# Patient Record
Sex: FEMALE | Race: WHITE | NOT HISPANIC OR LATINO | Employment: OTHER | URBAN - METROPOLITAN AREA
[De-identification: names, ages, dates, MRNs, and addresses within clinical notes are randomized per-mention and may not be internally consistent; named-entity substitution may affect disease eponyms.]

---

## 2018-04-04 ENCOUNTER — ESTABLISHED COMPREHENSIVE EXAM (OUTPATIENT)
Dept: URBAN - METROPOLITAN AREA CLINIC 46 | Facility: CLINIC | Age: 53
End: 2018-04-04

## 2018-04-04 DIAGNOSIS — H52.7: ICD-10-CM

## 2018-04-04 PROCEDURE — 92014 COMPRE OPH EXAM EST PT 1/>: CPT

## 2018-04-04 PROCEDURE — 92015 DETERMINE REFRACTIVE STATE: CPT

## 2018-04-04 ASSESSMENT — VISUAL ACUITY
OD_CC: 20/20
OS_CC: 20/25
OD_CC: J1+
OS_CC: J1+
OS_OTHER: FL-.25
OD_OTHER: FL-.25

## 2018-04-04 ASSESSMENT — TONOMETRY
OD_IOP_MMHG: 15
OS_IOP_MMHG: 15

## 2018-07-18 NOTE — PATIENT DISCUSSION
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 - Plan : See Plan # 1. Patient may consider lid surgery.

## 2018-07-18 NOTE — PATIENT DISCUSSION
(H25.013) Cortical age-related cataract, bilateral - Assesment : Examination revealed Cortical Senile Cataract OU. Patient is symptomatic with visual function affected. - Plan : Monitor for changes. Advised patient to call our office with decreased vision or increased symptoms.

## 2018-07-18 NOTE — PATIENT DISCUSSION
(H10.45) Other Chronic Allergic Conjunctivitis - Assesment : Examination revealed Allergic Conjunctivitis OS. - Plan : Continue Restasis BID OU. Recommend Alaway or Zatidor BID OU for itching, PF ATS 3-4 times daily: suggestions Thera Tears Blink Optive and GenTeal.  Monitor for changes. Advised patient to call our office with decreased vision or an increase in symptoms.     RTC 1 month  follow up

## 2018-08-14 NOTE — PATIENT DISCUSSION
(H10.45) Other Chronic Allergic Conjunctivitis - Assesment : Examination revealed Allergic Conjunctivitis OS. - Plan : Continue Restasis BID OU. Stop Alaway or Zatidor BID OU for itching, continue  PF ATS 3-4 times daily. Start Alrex BID for three weeks  Monitor for changes. Advised patient to call our office with decreased vision or an increase in symptoms.     RTC 3 weeks dilation/refraction

## 2018-09-19 NOTE — PATIENT DISCUSSION
(H10.45) Other chronic allergic conjunctivitis - Assesment : Examination revealed chronic Allergic Conjunctivitis OU. - Plan : see plan #1. Monitor for changes. Advised patient to call our office with decreased vision or an increase in symptoms.

## 2018-09-19 NOTE — PATIENT DISCUSSION
(F32.107) Keratoconjunct sicca, not specified as Sjogren's, bilateral - Assesment : Examination revealed Dry Eye Syndrome OU. - Plan : Recommend continue artificial tears 2-3 times daily OU. Continue restasis BID OU and decrease Alrex QD OU. Monitor for changes. Advised patient to call our office with decreased vision or increased symptoms. RV 6 months follow up.

## 2018-09-19 NOTE — PATIENT DISCUSSION
(H35.205) Drusen (degenerative) of macula, bilateral - Assesment : Examination revealed Drusen OU. - Plan : Monitor for changes. Advised patient to call our office with decreased vision or increased symptoms.

## 2018-12-12 ENCOUNTER — CONTACT LENS FOLLOW UP (OUTPATIENT)
Dept: URBAN - METROPOLITAN AREA CLINIC 46 | Facility: CLINIC | Age: 53
End: 2018-12-12

## 2018-12-12 DIAGNOSIS — H52.7: ICD-10-CM

## 2018-12-12 DIAGNOSIS — H04.123: ICD-10-CM

## 2018-12-12 PROCEDURE — 92310-1 LEVEL 1 CONTACT LENS MANAGEMENT

## 2018-12-13 NOTE — PATIENT DISCUSSION
(H10.45) Other chronic allergic conjunctivitis - Assesment : Examination revealed chronic Allergic Conjunctivitis OU. Orbital fat prolapse lower lid area due to being recumbent at night. Medical intervention is not warranted. - Plan : Continue Alrex BID OU 10 to 14 days. Monitor for changes. Advised patient to call our office with decreased vision or an increase in symptoms.     RTC 2-3 week/EXAM

## 2018-12-13 NOTE — PATIENT DISCUSSION
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 - Plan : No surgical intervention warranted at htis time.

## 2018-12-13 NOTE — PATIENT DISCUSSION
(H25.013) Cortical age-related cataract, bilateral - Assesment : Examination revealed Cortical Senile Cataract. Patient is symptomatic with visual function affected. Advised patient that he will not  have to go off of blood thinner fro cataract surgery. - Plan : Monitor for changes. Advised patient to call our office with decreased vision or increased symptoms. Discuss cataract surgery at next visit.

## 2019-02-13 NOTE — PATIENT DISCUSSION
(H25.013) Cortical age-related cataract, bilateral - Assesment : Examination revealed Cortical Senile Cataract. Patient is symptomatic with visual function affected. - Plan : Monitor for changes. Advised patient to call our office with decreased vision or increased symptoms.    RTC 1 year/EXAM

## 2019-02-13 NOTE — PATIENT DISCUSSION
(D23.121) Other benign neoplasm skin/ left upper eyelid, inc canthus - Assesment : Examination revealed benign neoplasm of the lids. - Plan : Monitor for changes. Advised patient to call our office with decreased vision or an increase in symptoms. Advised to use some neosporin on the area.

## 2019-02-13 NOTE — PATIENT DISCUSSION
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 - Plan : No surgical intervention warranted at this time.

## 2019-02-13 NOTE — PATIENT DISCUSSION
(H35.789) Drusen (degenerative) of macula, bilateral - Assesment : Examination revealed Drusen OU. - Plan : Monitor for changes. Advised patient to call our office with decreased vision or increased symptoms.

## 2019-06-27 NOTE — PATIENT DISCUSSION
(R99.457) Keratoconjunct sicca, not specified as Sjogren's, bilateral - Assesment : Examination revealed Dry Eye Syndrome OU. TBUT 3 seconds OU. Patient has been on restasis for 2-3 years. - Plan : Continue restasis BID OU. Monitor for changes. Advised patient to call our office with decreased vision or increased symptoms.  Keep scheduled appt

## 2020-02-26 ENCOUNTER — ESTABLISHED COMPREHENSIVE EXAM (OUTPATIENT)
Dept: URBAN - METROPOLITAN AREA CLINIC 46 | Facility: CLINIC | Age: 55
End: 2020-02-26

## 2020-02-26 DIAGNOSIS — Z01.00: ICD-10-CM

## 2020-02-26 DIAGNOSIS — H52.7: ICD-10-CM

## 2020-02-26 PROCEDURE — 92014 COMPRE OPH EXAM EST PT 1/>: CPT

## 2020-02-26 PROCEDURE — 92015 DETERMINE REFRACTIVE STATE: CPT

## 2020-02-26 ASSESSMENT — VISUAL ACUITY
OS_CC: J1
OU_CC: 20/20
OS_OTHER: FL-.25
OD_OTHER: FL+.25
OD_CC: J1
OU_CC: J1+
OD_CC: 20/20
OS_CC: 20/20

## 2020-02-26 ASSESSMENT — TONOMETRY
OS_IOP_MMHG: 16
OD_IOP_MMHG: 16

## 2021-10-06 ENCOUNTER — ESTABLISHED COMPREHENSIVE EXAM (OUTPATIENT)
Dept: URBAN - METROPOLITAN AREA CLINIC 46 | Facility: CLINIC | Age: 56
End: 2021-10-06

## 2021-10-06 DIAGNOSIS — Z01.00: ICD-10-CM

## 2021-10-06 DIAGNOSIS — E11.9: ICD-10-CM

## 2021-10-06 DIAGNOSIS — H04.123: ICD-10-CM

## 2021-10-06 DIAGNOSIS — H52.7: ICD-10-CM

## 2021-10-06 DIAGNOSIS — H40.023: ICD-10-CM

## 2021-10-06 DIAGNOSIS — H40.033: ICD-10-CM

## 2021-10-06 PROCEDURE — 92015 DETERMINE REFRACTIVE STATE: CPT

## 2021-10-06 PROCEDURE — 92014 COMPRE OPH EXAM EST PT 1/>: CPT

## 2021-10-06 ASSESSMENT — VISUAL ACUITY
OS_CC: 20/25
OD_CC: 20/25
OS_CC: J3
OD_CC: J2
OS_SC: 20/60
OD_SC: 20/60-2

## 2021-10-06 ASSESSMENT — TONOMETRY
OS_IOP_MMHG: 16
OD_IOP_MMHG: 17

## 2023-06-23 ENCOUNTER — COMPREHENSIVE EXAM (OUTPATIENT)
Dept: URBAN - METROPOLITAN AREA CLINIC 47 | Facility: CLINIC | Age: 58
End: 2023-06-23

## 2023-06-23 DIAGNOSIS — H40.023: ICD-10-CM

## 2023-06-23 DIAGNOSIS — Z01.00: ICD-10-CM

## 2023-06-23 DIAGNOSIS — H40.033: ICD-10-CM

## 2023-06-23 DIAGNOSIS — H04.123: ICD-10-CM

## 2023-06-23 DIAGNOSIS — H52.7: ICD-10-CM

## 2023-06-23 DIAGNOSIS — E11.9: ICD-10-CM

## 2023-06-23 PROCEDURE — 92014 COMPRE OPH EXAM EST PT 1/>: CPT

## 2023-06-23 PROCEDURE — 92015 DETERMINE REFRACTIVE STATE: CPT

## 2023-06-23 ASSESSMENT — TONOMETRY
OD_IOP_MMHG: 17
OS_IOP_MMHG: 17

## 2023-06-23 ASSESSMENT — VISUAL ACUITY
OD_CC: 20/20
OU_SC: 20/50
OD_SC: J8
OD_SC: 20/50-1
OU_SC: J5
OD_CC: J1+
OS_SC: J8
OS_CC: J1
OS_CC: 20/20-2
OS_SC: 20/60-1

## 2024-07-10 ENCOUNTER — COMPREHENSIVE EXAM (OUTPATIENT)
Dept: URBAN - METROPOLITAN AREA CLINIC 46 | Facility: CLINIC | Age: 59
End: 2024-07-10

## 2024-07-10 DIAGNOSIS — H04.123: ICD-10-CM

## 2024-07-10 DIAGNOSIS — E11.9: ICD-10-CM

## 2024-07-10 DIAGNOSIS — H52.7: ICD-10-CM

## 2024-07-10 DIAGNOSIS — H40.033: ICD-10-CM

## 2024-07-10 DIAGNOSIS — H40.023: ICD-10-CM

## 2024-07-10 PROCEDURE — 92014 COMPRE OPH EXAM EST PT 1/>: CPT

## 2024-07-10 PROCEDURE — 92015 DETERMINE REFRACTIVE STATE: CPT

## 2024-07-10 ASSESSMENT — TONOMETRY
OS_IOP_MMHG: 15
OD_IOP_MMHG: 15

## 2024-07-10 ASSESSMENT — VISUAL ACUITY
OU_SC: 20/40
OD_CC: 20/20
OS_SC: 20/60+2
OD_SC: J8
OS_CC: J1
OS_CC: 20/15
OU_CC: J1
OS_SC: J8
OU_CC: 20/15
OU_SC: J6
OD_CC: J1
OD_SC: 20/50

## 2025-07-16 ENCOUNTER — COMPREHENSIVE EXAM (OUTPATIENT)
Age: 60
End: 2025-07-16

## 2025-07-16 DIAGNOSIS — H40.023: ICD-10-CM

## 2025-07-16 DIAGNOSIS — H52.7: ICD-10-CM

## 2025-07-16 PROCEDURE — 92014 COMPRE OPH EXAM EST PT 1/>: CPT

## 2025-07-16 PROCEDURE — 92015 DETERMINE REFRACTIVE STATE: CPT

## 2025-07-16 PROCEDURE — 92133 CPTRZD OPH DX IMG PST SGM ON: CPT
